# Patient Record
Sex: FEMALE | Race: BLACK OR AFRICAN AMERICAN
[De-identification: names, ages, dates, MRNs, and addresses within clinical notes are randomized per-mention and may not be internally consistent; named-entity substitution may affect disease eponyms.]

---

## 2016-01-22 VITALS — SYSTOLIC BLOOD PRESSURE: 124 MMHG | DIASTOLIC BLOOD PRESSURE: 81 MMHG

## 2017-02-08 ENCOUNTER — HOSPITAL ENCOUNTER (EMERGENCY)
Dept: HOSPITAL 61 - ER | Age: 22
Discharge: LEFT BEFORE DISCHARGE | End: 2017-02-08
Payer: COMMERCIAL

## 2017-02-08 DIAGNOSIS — Z53.21: ICD-10-CM

## 2017-02-08 DIAGNOSIS — R51: Primary | ICD-10-CM

## 2021-12-30 ENCOUNTER — HOSPITAL ENCOUNTER (EMERGENCY)
Dept: HOSPITAL 61 - ER | Age: 26
Discharge: HOME | End: 2021-12-30
Payer: COMMERCIAL

## 2021-12-30 VITALS — DIASTOLIC BLOOD PRESSURE: 83 MMHG | SYSTOLIC BLOOD PRESSURE: 133 MMHG

## 2021-12-30 VITALS — BODY MASS INDEX: 22.84 KG/M2 | HEIGHT: 62 IN | WEIGHT: 124.12 LBS

## 2021-12-30 DIAGNOSIS — J06.9: Primary | ICD-10-CM

## 2021-12-30 DIAGNOSIS — Z88.6: ICD-10-CM

## 2021-12-30 DIAGNOSIS — Z20.822: ICD-10-CM

## 2021-12-30 DIAGNOSIS — Z88.2: ICD-10-CM

## 2021-12-30 LAB
INFLUENZA A PATIENT: NEGATIVE
INFLUENZA B PATIENT: NEGATIVE

## 2021-12-30 PROCEDURE — 99285 EMERGENCY DEPT VISIT HI MDM: CPT

## 2021-12-30 PROCEDURE — U0003 INFECTIOUS AGENT DETECTION BY NUCLEIC ACID (DNA OR RNA); SEVERE ACUTE RESPIRATORY SYNDROME CORONAVIRUS 2 (SARS-COV-2) (CORONAVIRUS DISEASE [COVID-19]), AMPLIFIED PROBE TECHNIQUE, MAKING USE OF HIGH THROUGHPUT TECHNOLOGIES AS DESCRIBED BY CMS-2020-01-R: HCPCS

## 2021-12-30 PROCEDURE — 81025 URINE PREGNANCY TEST: CPT

## 2021-12-30 PROCEDURE — 87426 SARSCOV CORONAVIRUS AG IA: CPT

## 2021-12-30 PROCEDURE — 94640 AIRWAY INHALATION TREATMENT: CPT

## 2021-12-30 PROCEDURE — 87804 INFLUENZA ASSAY W/OPTIC: CPT

## 2021-12-30 NOTE — PHYS DOC
Past Medical History


Past Medical History:  Seizure


Past Surgical History:  No Surgical History


Smoking Status:  Current Every Day Smoker


Additional Information:  


0.25 PPD


Alcohol Use:  Occasionally


Drug Use:  Marijuana





General Adult


EDM:


Chief Complaint:  FLU SYMPTOM





HPI:


HPI:





Patient is a 26 year old female who presents with headache, body aches, 

nonproductive cough, nausea and vomiting.  Patient reports her symptoms began 4 

days ago.  She states that because of her cough, she has 8/10 pain in her "ribs"

that is nonradiating.  Patient reports she feels nauseated and has had a couple 

episodes of unprovoked emesis.  She also has had episodes of posttussive emesis.

 Patient has not taken her temperature at home, but reports associated chills.  

Patient denies sick contacts.  She had 2 doses of the [place] vaccine against 

COVID-19, but denies having had a flu shot yet this year.  Patient denies fever,

chest pain, palpitations, abdominal pain, diarrhea and constipation.





Review of Systems:


Review of Systems:


Constitutional:   See HPI


Eyes:   Denies change in visual acuity or visual field deficit.


HENT:   Denies nasal congestion or sore throat.


Respiratory:   See HPI


Cardiovascular:   See HPI


GI:   See HPI


:  Denies dysuria or hematuria.


Musculoskeletal:   Denies back pain or joint pain. 


Integument:   Denies rash or other skin lesions.





Heart Score:


C/O Chest Pain:  N/A





Current Medications:





Current Medications








 Medications


  (Trade)  Dose


 Ordered  Sig/Jarvis  Start Time


 Stop Time Status Last Admin


Dose Admin


 


 Albuterol/


 Ipratropium


  (Duoneb)  3 ml  1X  ONCE  12/30/21 09:15


 12/30/21 09:16 DC  














Allergies:


Allergies:





Allergies








Coded Allergies Type Severity Reaction Last Updated Verified


 


  Sulfa (Sulfonamide Antibiotics) Allergy Intermediate Itching 5/13/15 Yes


 


  tramadol Allergy Mild Nausea 5/13/15 No











Physical Exam:


PE:





Constitutional: Well developed, well nourished, no acute distress, patient appe

ars fatigued.


HENT: Normocephalic, atraumatic, bilateral external ears without deformity or 

discharge, oropharynx moist, no oral exudates, nose without deformity or 

discharge, bilateral turbinates nonerythematous.


Eyes: PERRLA, EOMI, conjunctiva normal, no discharge. 


Cardiovascular: Heart rate regular rhythm, no murmur.


Lungs & Thorax: Wheezing heard throughout lung fields, especially in right upper

lobe.


Abdomen: Bowel sounds normal, soft, no tenderness, no masses, no pulsatile 

masses.  


Skin: Warm, dry, no erythema, no rash.





Current Patient Data:


Labs:





                                Laboratory Tests








Test


 12/30/21


09:07 12/30/21


09:15


 


Bedside Urine HCG, Qualitative


 Hcg negative


(Negative) 





 


Influenza Type A Antigen


 


 Negative


(NEGATIVE)


 


Influenza Type B Antigen


 


 Negative


(NEGATIVE)


 


SARS-CoV-2 Antigen (Rapid)


 


 Negative


(NEGATIVE)








Vital Signs:





                                   Vital Signs








  Date Time  Temp Pulse Resp B/P (MAP) Pulse Ox O2 Delivery O2 Flow Rate FiO2


 


12/30/21 09:01 98.0 84 16 121/77 (92) 100 Room Air  





 98.0       











Course & Med Decision Making:


Course & Med Decision Making


Pertinent Labs and Imaging studies reviewed. (See chart for details)





Patient is an otherwise healthy 26-year-old female who smokes approximately 4 

cigarettes/day presenting with upper respiratory symptoms.  Wheezing was 

appreciated on exam.  Flu A&B swabs as well as Covid swab will be obtained.  

Patient will be provided with breathing treatment and reevaluated.





Rapid swabs for influenza and Covid are negative.  Lung sounds improved on 

reevaluation.  





Patient is instructed to quarantine until PCR Covid swab test result becomes 

available.  She was counseled on supportive treatment measures including bedside

humidifier at night, Mucinex for decongestant, albuterol for wheezing and 

Tessalon Perles for cough.  Patient understands and is agreeable to discharge 

plan.





Dragon Disclaimer:


Dragon Disclaimer:


This electronic medical record was generated, in whole or in part, using a voice

recognition dictation system.





Departure


Departure


Impression:  


   Primary Impression:  


   Upper respiratory infection


   Qualified Codes:  J06.9 - Acute upper respiratory infection, unspecified


Disposition:  01 HOME / SELF CARE / HOMELESS


Condition:  STABLE


Referrals:  


NIMCO LEY (PCP)


Patient Instructions:  Upper Respiratory Infection, Adult, Easy-to-Read





Additional Instructions:  


Follow the following supportive treatment measures:


 - Cool mist humidifier with plain water at bedside while you sleep


 - Albuterol inhaler for wheezing or shortness of breath


 - Mucinex (guaifenesin) per box instructions


 - Tessalon perles (benzonatate) for cough, especially at night before bed





You have been tested for COVID-19. It is an infection caused by a new type of 

coronavirus. COVID-19 will cause cold-like or mild flu symptoms in most. It can 

cause more severe symptoms like problems breathing in some. There is no tammi

atment for COVID-19. The body will clear the infection over time. Self-care will

help to ease discomfort.





Steps to Take:


 - Rest as needed. 


 - Choose healthy foods including fruits and vegetables. Drink water throughout 

the day.


 - Get plenty of sleep each night.


 - If you smoke, try to quit. It may ease breathing.


 - Avoid alcohol.


 - Keep Others Healthy


 - The virus can spread to others. Droplets are released every time you sneeze 

or cough. The droplets can get into the mouth, nose, or eyes of people near you 

and lead to infection. To lower the chances of spreading COVID-19 to others:





Stay at home until your doctor has said it is safe to leave. If you tested 

positive this will mean staying isolated until both of the following are true:


 - At least 7 days have passed since the start of illness.


 - You are free of fever for at least 72 hours without the use of medicine.





During this time:


 - Avoid public areas, events, or transportation. Do not return to work or 

school until your doctor has said it is safe to do so.


 - Call ahead if you need to go to a medical center. Let them know you may have 

COVID-19. It will help them guide you where to go. They may also ask you to wear

a facemask when you come to the office.


 - If you call for emergency medical services, let them know you may have COVID-

19.





While at home:


 - Try to avoid close contact with others. Stay about 6 feet away.


 - If possible, spend most of your time in a separate room from others.


 - Use a face mask if you will be in close contact with others such as sharing a

room or vehicle.


 - Have someone wipe down common surfaces in the home. Use household  

every day on areas like doorknobs, counters, or sinks.


 - Cough or sneeze into a tissue. Throw the tissue away right after use. If a 

tissue is not available, cough or sneeze into your elbow.


 - Wash your hands often. Wash them after sneezing or coughing. Use soap and w

ater and wash or at least 20 seconds. Alcohol based hand  can be used if 

soap and water is not available.


 - Do not prepare food for others. Avoid sharing personal items like forks, 

spoons, or toothbrushes.


 - Avoid close contact with pets while you are sick. There is no evidence of the

virus passing to pets. This is a safety step until more is known about this 

virus.


 - Isolation can be frustrating. Social interaction can help. Keep in touch with

friends and family through phone and tech options. You can still interact with 

others in your home, just keep a safe distance of about 6 feet.





Follow-up:


 - Your doctors office will check in with you to see if there are any changes 

in your health. 


 - You may be asked to keep track of symptoms to share with them. They will also

let you know when you are clear to be in public again.





Contact your doctor if your recovery is not going as you expect. Get emergency 

care if you have problems such as:


 - Trouble breathing with oxygen saturation <90%


 - Nonstop chest pain or pressure


 - Changes in awareness, confusion, or problems waking


 - Lips or face have bluish color


 - Worsening of symptoms





If you think you have an emergency, call for emergency medical services right 

away.





As taken from [facility]


Scripts


Benzonatate (BENZONATATE) 100 Mg Capsule


1-2 CAP PO HS, #30 CAP


   Prov: JOSH IYER         12/30/21 


Albuterol Sulfate (PROAIR HFA INHALER) 8.5 Gm Hfa.aer.ad


2 PUFF IH PRN Q4-6HRS PRN for wheezing for 21 Days, #1 INHALER 0 Refills


   Prov: JOSH IYER         12/30/21











JOSH IYER              Dec 30, 2021 09:29